# Patient Record
Sex: FEMALE | Race: ASIAN | URBAN - METROPOLITAN AREA
[De-identification: names, ages, dates, MRNs, and addresses within clinical notes are randomized per-mention and may not be internally consistent; named-entity substitution may affect disease eponyms.]

---
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
## 2023-01-12 ENCOUNTER — OFFICE VISIT (OUTPATIENT)
Dept: URBAN - METROPOLITAN AREA CLINIC 33 | Facility: CLINIC | Age: 18
End: 2023-01-12
Payer: COMMERCIAL

## 2023-01-12 DIAGNOSIS — H10.413 CHRONIC GIANT PAPILLARY CONJUNCTIVITIS, BILATERAL: Primary | ICD-10-CM

## 2023-01-12 PROCEDURE — 99213 OFFICE O/P EST LOW 20 MIN: CPT

## 2023-01-12 ASSESSMENT — INTRAOCULAR PRESSURE
OD: 17
OS: 17

## 2023-01-12 NOTE — IMPRESSION/PLAN
Impression: Chronic giant papillary conjunctivitis, bilateral: H10.413.
-current contact lens wearer of monthly lenses Plan: Patient educated on findings. Advised patient to discontinue wearing contact lenses for at least 3 weeks. Recommend patient to continue using prednisolone for 2 weeks. Also recommend patient to use artificial tears QID OU. Discussed good contact lens hygiene with patient and to decrease time of contact lens wear. Recommend patient to also use clearcare as cleaning solution. Continue to monitor.

## 2023-01-26 ENCOUNTER — OFFICE VISIT (OUTPATIENT)
Dept: URBAN - METROPOLITAN AREA CLINIC 33 | Facility: CLINIC | Age: 18
End: 2023-01-26
Payer: COMMERCIAL

## 2023-01-26 DIAGNOSIS — H10.413 CHRONIC GIANT PAPILLARY CONJUNCTIVITIS, BILATERAL: Primary | ICD-10-CM

## 2023-01-26 DIAGNOSIS — H04.123 DRY EYE SYNDROME OF BILATERAL LACRIMAL GLANDS: ICD-10-CM

## 2023-01-26 PROCEDURE — 99213 OFFICE O/P EST LOW 20 MIN: CPT

## 2023-01-26 ASSESSMENT — INTRAOCULAR PRESSURE
OD: 18
OS: 19

## 2023-01-26 NOTE — IMPRESSION/PLAN
Impression: Chronic giant papillary conjunctivitis, bilateral: H10.413.
-current contact lens wearer of monthly lenses
-mild GPC OU; improved since previous Plan: Patient educated on findings. Advised patient to discontinue wearing contact lenses for at least 2 weeks. Recommend patient to continue using prednisolone 1x/day for one week and then discontinue. Also recommend patient to use artificial tears QID OU. Discussed good contact lens hygiene with patient and to decrease time of contact lens wear. Recommend patient to also use clearcare as cleaning solution. Continue to monitor.